# Patient Record
Sex: MALE | Race: WHITE | NOT HISPANIC OR LATINO | Employment: UNEMPLOYED | URBAN - METROPOLITAN AREA
[De-identification: names, ages, dates, MRNs, and addresses within clinical notes are randomized per-mention and may not be internally consistent; named-entity substitution may affect disease eponyms.]

---
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
## 2023-06-16 ENCOUNTER — TRANSFERRED RECORDS (OUTPATIENT)
Dept: HEALTH INFORMATION MANAGEMENT | Facility: CLINIC | Age: 18
End: 2023-06-16

## 2023-08-13 ENCOUNTER — HEALTH MAINTENANCE LETTER (OUTPATIENT)
Age: 18
End: 2023-08-13

## 2023-09-07 ENCOUNTER — HOSPITAL ENCOUNTER (OUTPATIENT)
Dept: BEHAVIORAL HEALTH | Facility: CLINIC | Age: 18
Discharge: HOME | End: 2023-09-07
Attending: FAMILY MEDICINE | Admitting: FAMILY MEDICINE
Payer: COMMERCIAL

## 2023-09-07 PROCEDURE — 90791 PSYCH DIAGNOSTIC EVALUATION: CPT | Performed by: COUNSELOR

## 2023-09-07 ASSESSMENT — COLUMBIA-SUICIDE SEVERITY RATING SCALE - C-SSRS
TOTAL  NUMBER OF ABORTED OR SELF INTERRUPTED ATTEMPTS LIFETIME: NO
TOTAL  NUMBER OF INTERRUPTED ATTEMPTS LIFETIME: NO
ATTEMPT PAST THREE MONTHS: NO
ATTEMPT LIFETIME: YES
REASONS FOR IDEATION LIFETIME: COMPLETELY TO END OR STOP THE PAIN (YOU COULDN'T GO ON LIVING WITH THE PAIN OR HOW YOU WERE FEELING)
2. HAVE YOU ACTUALLY HAD ANY THOUGHTS OF KILLING YOURSELF?: NO
1. HAVE YOU WISHED YOU WERE DEAD OR WISHED YOU COULD GO TO SLEEP AND NOT WAKE UP?: YES
6. HAVE YOU EVER DONE ANYTHING, STARTED TO DO ANYTHING, OR PREPARED TO DO ANYTHING TO END YOUR LIFE?: NO
TOTAL  NUMBER OF ACTUAL ATTEMPTS LIFETIME: 1
1. IN THE PAST MONTH, HAVE YOU WISHED YOU WERE DEAD OR WISHED YOU COULD GO TO SLEEP AND NOT WAKE UP?: NO

## 2023-09-07 ASSESSMENT — PATIENT HEALTH QUESTIONNAIRE - PHQ9
5. POOR APPETITE OR OVEREATING: NEARLY EVERY DAY
SUM OF ALL RESPONSES TO PHQ QUESTIONS 1-9: 10

## 2023-09-07 ASSESSMENT — ANXIETY QUESTIONNAIRES
2. NOT BEING ABLE TO STOP OR CONTROL WORRYING: MORE THAN HALF THE DAYS
GAD7 TOTAL SCORE: 12
6. BECOMING EASILY ANNOYED OR IRRITABLE: NOT AT ALL
5. BEING SO RESTLESS THAT IT IS HARD TO SIT STILL: SEVERAL DAYS
3. WORRYING TOO MUCH ABOUT DIFFERENT THINGS: MORE THAN HALF THE DAYS
1. FEELING NERVOUS, ANXIOUS, OR ON EDGE: MORE THAN HALF THE DAYS
IF YOU CHECKED OFF ANY PROBLEMS ON THIS QUESTIONNAIRE, HOW DIFFICULT HAVE THESE PROBLEMS MADE IT FOR YOU TO DO YOUR WORK, TAKE CARE OF THINGS AT HOME, OR GET ALONG WITH OTHER PEOPLE: SOMEWHAT DIFFICULT
GAD7 TOTAL SCORE: 12
7. FEELING AFRAID AS IF SOMETHING AWFUL MIGHT HAPPEN: MORE THAN HALF THE DAYS

## 2023-09-07 NOTE — PROGRESS NOTES
"    [facility]      PATIENT'S NAME: Maximiliano Joshua  PREFERRED NAME: Maximiliano  PRONOUNS: he/him/his     MRN: [id number]  : 2005  ADDRESS: [address] [place] [address] NUMBER:  [id number]  DATE OF SERVICE: 23  START TIME: 1300  END TIME: 1430  PREFERRED PHONE: [phone]  May we leave a program related message: Yes  SERVICE MODALITY:  In-person    [place] ADULT Mental Health DIAGNOSTIC ASSESSMENT    Identifying Information:  Patient is a 18 year old,    individual.  Patient was referred for an assessment by  therapist .  Patient attended the session alone.    Chief Complaint:   The reason for seeking services at this time is: \" outpatient treatment \"   The problem(s) began around age 14. Patient has attempted to resolve these concerns in the past through therapy and medications .    Social/Family History:  Patient reported they grew up in  [place], [place] .  They were raised by biological parents.  Parents stayed ..   Patient reported that their childhood was \"fine\".  Patient described their current relationships with family of origin as \"pretty good\".      The patient describes their cultural background as \"white, Indian, upper middle class\".  Cultural influences and impact on patient's life structure, values, norms, and healthcare:  Patient denies .  Contextual influences on patient's health include: Contextual Factors: Individual Factors Educational stress  .  Cultural, Contextual, and socioeconomic factors do not affect the patient's access to services.  These factors will be addressed in the Preliminary Treatment plan.  Patient identified their preferred language to be English. Patient reported they do not  need the assistance of an  or other support involved in therapy.     Patient reported had no significant delays in developmental tasks.   Patient's highest education level was some college. " "Patient identified the following learning problems: attention and concentration.  Modifications will not be used to assist communication in therapy.   Patient reports they are  able to understand written materials.    Patient reported the following relationship history : Patient is currently single.  Patient's current relationship status is single for \"awhile\".   Patient identified their sexual orientation as heterosexual.  Patient reported having zero child(sherry). Patient identified parents and therapist as part of their support system.  Patient identified the quality of these relationships as stable and meaningful.     Patient's current living/housing situation involves staying with 3 roommates while at school .  They live with 3 roommates and they report that housing is not stable.     Patient is currently a student and reports they are able to function appropriately at school..  Patient reports their finances are obtained through parents.  Patient does not identify finances as a current stressor.      Patient reported that they have not been involved with the legal system.   Patient denies being on probation / parole / under the jurisdiction of the court.    Patient's Strengths and Limitations:  Patient identified the following strengths or resources that will help them succeed in treatment: commitment to health and well being. Things that may interfere with the patient's success in treatment include: none identified.     Assessments:  The following assessments were completed by patient for this visit:  PHQ9:       9/7/2023    12:48 PM   PHQ-9 SCORE   PHQ-9 Total Score 10     GAD7:       9/7/2023    12:48 PM   JAMIL-7 SCORE   Total Score 12     CAGE-AID:       9/7/2023    12:48 PM   CAGE-AID Total Score   Total Score 3     PROMIS 10-Global Health (only subscores and total score):       9/7/2023    12:48 PM   PROMIS-10 Scores Only   Global Mental Health Score 11   Global Physical Health Score 17   PROMIS TOTAL - " SUBSCORES 28     [place] Suicide Severity Rating Scale (Lifetime/Recent)       No data to display                Personal and Family Medical History:  Patient does report a family history of mental health concerns.  Patient reports family history includes Depression in his mother; Substance Abuse in his mother..     Patient does report Mental Health Diagnosis and/or Treatment.  Patient Patient reported the following previous diagnoses which include(s): an Anxiety Disorder and Depression.  Patient reported symptoms began age 14.   Patient has received mental health services in the past:  therapy .  Psychiatric Hospitalizations: None.  Patient denies a history of civil commitment.  Patient is receiving other mental health services.  These include  Patient is receiving services through [facility]  .       Patient has had a physical exam to rule out medical causes for current symptoms.  Date of last physical exam was greater than a year ago and client was encouraged to schedule an exam with PCP. The patient does not have a Primary Care Provider and was encouraged to establish care with a PCP. [facility] Patient reports the following current dental concerns: TMJ .  Patient denies any issues with pain..   There are not significant appetite / nutritional concerns / weight changes. These may include: no concerns. Patient reports the following sleep concerns:  No concerns.   Patient does not report a history of head injury / trauma / cognitive impairment.      Patient reports not taking any current medications      Patient Allergies:    Allergies   Allergen Reactions    Cats        Medical History:  History reviewed. No pertinent past medical history.      Current Mental Status Exam:   Appearance:  Appropriate    Eye Contact:  Good   Psychomotor:  Normal       Gait / station:  no problem  Attitude / Demeanor: Cooperative   Speech      Rate / Production: Normal/ Responsive      Volume:  Normal  volume       Language:  intact  Mood:   Normal  Affect:   Appropriate    Thought Content: Clear   Thought Process: Logical       Associations: No loosening of associations  Insight:   Good   Judgment:  Intact   Orientation:  All  Attention/concentration: Good    Substance Use:  Patient did report a family history of substance use concerns; see medical history section for details.  Patient has not received chemical dependency treatment in the past.  Patient has not ever been to detox.      Patient is not currently receiving any chemical dependency treatment. Patient reported the following problems as a result of their substance use:   Patient denies .    Patient reports using alcohol 5 times per month and has 4 drinks at a time. Patient first started drinking at age 13.  Patient reported date of last use was 9/6/23.  Patient reports heaviest use is current use.  Patient denies using tobacco.  Patient denies using cannabis.  Patient reports stopping use about 3-4 months.  Patient denies using caffeine.  Patient reports using/abusing the following substance(s). Patient reported no other substance use.     Substance Use: No symptoms    Based on the positive CAGE score and clinical interview there  are not indications of drug or alcohol abuse currently. Patient reports stopping use of cannabis about 3-4 months ago.    Significant Losses / Trauma / Abuse / Neglect Issues:   Patient   did not serve in the .  There are indications or report of significant loss, trauma, abuse or neglect issues related to: are no indications and client denies any losses, trauma, abuse, or neglect concerns.  Concerns for possible neglect are not present.     Safety Assessment:   Patient denies current homicidal ideation and behaviors.  Patient denies current self-injurious ideation and behaviors.    Patient denied risk behaviors associated with substance use.  Patient denies any high risk behaviors associated with mental health symptoms.  Patient  reports the following current concerns for their personal safety: None.  Patient reports there  are not firearms in the house.       There are no firearms in the home..    History of Safety Concerns:  Patient denied a history of homicidal ideation.     Patient denied a history of personal safety concerns.    Patient denied a history of assaultive behaviors.    Patient denied a history of sexual assault behaviors.     Patient denied a history of risk behaviors associated with substance use.  Patient denies any history of high risk behaviors associated with mental health symptoms.  Patient reports the following protective factors:  future focused thinking    Risk Plan:  See Recommendations for Safety and Risk Management Plan    Review of Symptoms per patient report:   Depression: Lack of interest, Excessive or inappropriate guilt, Change in energy level, Difficulties concentrating, Feelings of hopelessness, Ruminations, Feeling sad, down, or depressed, and Withdrawn  Kelsie:  No Symptoms  Psychosis: No Symptoms  Anxiety: Excessive worry, Nervousness, Physical complaints, such as headaches, stomachaches, muscle tension, Social anxiety, Ruminations, and Poor concentration  Panic:  Sense of impending doom  Post Traumatic Stress Disorder:  No Symptoms   Eating Disorder: No Symptoms  ADD / ADHD:  Inattentive, Difficulties listening, Poor task completion, Distractibility, Forgetful, and Restlessness/fidgety  Conduct Disorder: No symptoms  Autism Spectrum Disorder: No symptoms  Obsessive Compulsive Disorder: Checking    Patient reports the following compulsive behaviors and treatment history:  Patient denies .      Diagnostic Criteria:   Generalized Anxiety Disorder  A. Excessive anxiety and worry about a number of events or activities (such as work or school performance).   B. The person finds it difficult to control the worry.  C. Select 3 or more symptoms (required for diagnosis). Only one item is required in children.   -  Restlessness or feeling keyed up or on edge.    - Being easily fatigued.    - Difficulty concentrating or mind going blank.    - Muscle tension.    - Sleep disturbance (difficulty falling or staying asleep, or restless unsatisfying sleep).   D. The focus of the anxiety and worry is not confined to features of an Axis I disorder.  E. The anxiety, worry, or physical symptoms cause clinically significant distress or impairment in social, occupational, or other important areas of functioning.   F. The disturbance is not due to the direct physiological effects of a substance (e.g., a drug of abuse, a medication) or a general medical condition (e.g., hyperthyroidism) and does not occur exclusively during a Mood Disorder, a Psychotic Disorder, or a Pervasive Developmental Disorder. Major Depressive Disorder  CRITERIA (A-C) REPRESENT A MAJOR DEPRESSIVE EPISODE - SELECT THESE CRITERIA  A) Recurrent episode(s) - symptoms have been present during the same 2-week period and represent a change from previous functioning 5 or more symptoms (required for diagnosis)   - Depressed mood. Note: In children and adolescents, can be irritable mood.     - Diminished interest or pleasure in all, or almost all, activities.    - Fatigue or loss of energy.    - Feelings of worthlessness or inappropriate guilt.    - Diminished ability to think or concentrate, or indecisiveness.   B) The symptoms cause clinically significant distress or impairment in social, occupational, or other important areas of functioning  C) The episode is not attributable to the physiological effects of a substance or to another medical condition  D) The occurence of major depressive episode is not better explained by other thought / psychotic disorders  E) There has never been a manic episode or hypomanic episode    Functional Status:  Patient reports the following functional impairments:  educational activities, management of the household and or completion of tasks,  "and relationship(s).     Programmatic care:  Current LOCUS was assigned and patient needs the following level of care based on score 17  .    Clinical Summary:  1. Reason for assessment: to determine level of care  .  2. Psychosocial, Cultural and Contextual Factors: Educational stress  .  3. Principal DSM5 Diagnoses  (Sustained by DSM5 Criteria Listed Above):   300.02 (F41.1) Generalized Anxiety Disorder.  4. Other Diagnoses that is relevant to services:   296.32 (F33.1) Major Depressive Disorder, Recurrent Episode, Moderate _ and With anxious distress.  5. Provisional Diagnosis:  Further diagnosis clarification may be helpful.  6. Prognosis: Expect Improvement.  7. Likely consequences of symptoms if not treated: higher level of care.  8. Client strengths include:  has a previous history of therapy, insightful, intelligent, motivated, and open to learning .     Recommendations:     1. Plan for Safety and Risk Management:   Safety and Risk: A safety and risk management plan has been developed including:   Moderate Risk is identified when the patient has one of the following:  Suicidal behavior more than three months ago ( C-SSRS Suicidal Behavior Lifetime)    The following has been recommended:  Complete/Review/Update Safety Plan    Safety Plan:  Adult Long Safety Plan:     [facility]                                       Maximiliano Joshua     SAFETY PLAN:  Step 1: Warning signs / cues (Thoughts, images, mood, situation, behavior) that a crisis may be developing:  Thoughts: \"I can't do this anymore\"  Step 2: Coping strategies - Things I can do to take my mind off of my problems without contacting another person (relaxation technique, physical activity):  Distress Tolerance Strategies:  change body temperature (ice pack/cold water)  and paced breathing/progressive muscle relaxation  Physical Activities: meditation, deep breathing, and stretching   Focus on helpful " "thoughts:  \"This is temporary\", \"I will get through this\", and \"It always passes\"  Step 3: People and social settings that provide distraction:   Name: parents Phone: in phone   Name: therapist Phone: in phone  Step 5: When I am in crisis, I can ask these people to help me use my safety plan:    Name: parents Phone: in phone   Name: therapist Phone: in phone  Step 6: Making the environment safe:   be around others  Step 7: Professionals or agencies I can contact during a crisis:  Suicide Prevention Lifeline: Call or Text 988     Call 911 or go to my nearest emergency department.   I helped develop this safety plan and agree to use it when needed.  I have been given a copy of this plan.      Client signature _________________________________________________________________  Today s date:  9/7/2023  Completed by Provider Name/ Credentials:  Luann Romero [facility]  September 7, 2023  Adapted from Safety Plan Template 2008 Kaylene Ordonez and Jack Conteh is reprinted with the express permission of the authors.  No portion of the Safety Plan Template may be reproduced without the express, written permission.  You can contact the authors at bhs@[facility] or reece@mail.[facility].    .  Patient consented to co-developed safety plan.  Safety and risk management plan was completed.  Patient agreed to use safety plan should any safety concerns arise.  A copy was given to the patient..          Report to child / adult protection services was NA.     2. Patient's identified  none identified .     3. Initial Treatment will focus on:    Depressed Mood -    Anxiety -   .     4. Resources/Service Plan:    services are not indicated.   Modifications to assist communication are not indicated.   Additional disability accommodations are not indicated.      5. Collaboration:   Collaboration / coordination of treatment will be initiated with the following  support professionals: outpatient therapist.      6.  " Referrals:   The following referral(s) will be initiated: Outpatient Mental Health Therapy Group. Next Scheduled Appointment: Referrals will be sent to community providers that work better with patients school schedule.      A Release of Information has been obtained for the following:  Emergency Contact, Alberto Cox [place], [facility] .     Emergency Contact Chayito Joshua was obtained.      Clinical Substantiation/medical necessity for the above recommendations:  .  Patient is a 18 year old who presents with mood concerns.  Patient reports history of Depression, Anxiety.  Patient has historically been able to manage symptoms through therapy.     Patients acute suicide risk was determined to be moderate due to the following factors:Patient denies current thoughts of suicidal ideation and self harm and reports ability to keep self safe. Patient completed and reviewed safety plan with  and reports ability to follow plan.    Patient is not currently under the influence of alcohol or illicit substances, denies experiencing command hallucinations, and has no immediate access to firearms. Protective factors include: Patient reports the following protective factors: dedication to family/friends, safe and stable environment, daily obligations, committment to well-being, sense of personal control or determination and access to a variety of clinical interventions    Patient denies current substance use concerns and reports ability to maintain safety when using substances.    Patient would benefit from more extensive mental health support such as an outpatient treatment program to help mitigate risk of hospitalization or suicidality. Patient is in agreement with plan. Options for treatment and follow-up care were reviewed with the patient. Patient was engaged and actively involved in the decision making process. Patient verbalized understanding of the options discussed and was satisfied with the final plan.  [facility].  Patient declined alternative placement options at this time and agreed to reach out to  should this change.  Contact information was provided.    7. CHELLE:    CHELLE:  Discussed the general effects of drugs and alcohol on health and well-being. Provider gave patient printed information about the  effects of chemical use on their health and well being. Recommendations:  to abstain from all mood altering substances .     8. Records:   These were reviewed at time of assessment.   Information in this assessment was obtained from the medical record and  provided by patient who is a good historian.    Patient will have open access to their mental health medical record.    9.   Interactive Complexity: No    Provider Name/ Credentials:  Luann Romero [facility]  2023      LOCUS Worksheet     Name: Maximiliano Joshua MRN: [id number]    : 2005      Gender:  male    PMI:     Provider Name: Luann Romero [facility]   Provider NPI:  [id number]    Actual level of Care Provided:  therapy    Service(s) receiving or referred to:  IOP    Reason for Variance: higher level of care      Rating completed by: Luann [facility]      I. Risk of Harm:   3      Moderate Risk of Harm    II. Functional Status:   2      Mild Impairment    III. Co-Morbidity:   2      Minor Co-Morbidity    IV - A. Recovery Environment - Level of Stress:   2      Mildly Stressful Environment    IV - B. Recovery Environment - Level of Support:   3      Limited Support in Environment    V. Treatment and Recovery History:   3      Moderate to Equivocal Response to Treatment and Recovery Management    VI. Engagement and Recovery Project:   2      Positive Engagement and Recovery       17 Composite Score    Level of Care Recommendation:   17 to 19       High Intensity Community Based Services

## 2024-10-05 ENCOUNTER — HEALTH MAINTENANCE LETTER (OUTPATIENT)
Age: 19
End: 2024-10-05